# Patient Record
Sex: FEMALE | Race: BLACK OR AFRICAN AMERICAN | NOT HISPANIC OR LATINO | URBAN - METROPOLITAN AREA
[De-identification: names, ages, dates, MRNs, and addresses within clinical notes are randomized per-mention and may not be internally consistent; named-entity substitution may affect disease eponyms.]

---

## 2017-06-26 ENCOUNTER — EMERGENCY (EMERGENCY)
Facility: HOSPITAL | Age: 52
LOS: 1 days | Discharge: HOME | End: 2017-06-26
Attending: EMERGENCY MEDICINE
Payer: MEDICAID

## 2017-06-26 VITALS
OXYGEN SATURATION: 100 % | HEART RATE: 70 BPM | TEMPERATURE: 98 F | DIASTOLIC BLOOD PRESSURE: 79 MMHG | RESPIRATION RATE: 20 BRPM | HEIGHT: 66 IN | SYSTOLIC BLOOD PRESSURE: 122 MMHG | WEIGHT: 210.98 LBS

## 2017-06-26 DIAGNOSIS — H11.31 CONJUNCTIVAL HEMORRHAGE, RIGHT EYE: ICD-10-CM

## 2017-06-26 PROCEDURE — 99283 EMERGENCY DEPT VISIT LOW MDM: CPT

## 2017-06-26 PROCEDURE — 99282 EMERGENCY DEPT VISIT SF MDM: CPT

## 2017-06-26 NOTE — ED PROVIDER NOTE - OBJECTIVE STATEMENT
52 year-old female, no significant PMHx, presents with cc right eye redness since this AM. Unknown triggering events. Noticed redness to right eye today. Associated with scratchy feeling to eye. Denies pain, vision change, discharge, pain with eye movement, increased tearing, history of wearing contact lenses or scratching eyes. Reports that has been coughing more recently from allergies.

## 2017-06-26 NOTE — ED PROVIDER NOTE - MEDICAL DECISION MAKING DETAILS
52 year-old female, presents with non-traumatic, painless right eye redness today. Well-appearing. Low suspicion of infection. history and findings suggestive of subconjunctival hemorrhage. Plan: fluorescein stain r/o abrasion and ophthalmo follow up.

## 2017-06-26 NOTE — ED PROVIDER NOTE - PROGRESS NOTE DETAILS
No abrasion on fluorescein exam. Will discharge with eye follow up. Pt is well appearing walking with steady gait, stable for discharge and follow up without fail with medical doctor. I had a detailed discussion with the patient and/or guardian regarding the historical points, exam findings, and any diagnostic results supporting the discharge diagnosis. Pt educated on care and need for follow up. Strict return instructions and red flag signs and symptoms discussed with patient. Questions answered. Pt shows understanding of discharge information and agrees to follow.
